# Patient Record
Sex: FEMALE | Race: BLACK OR AFRICAN AMERICAN | NOT HISPANIC OR LATINO | URBAN - METROPOLITAN AREA
[De-identification: names, ages, dates, MRNs, and addresses within clinical notes are randomized per-mention and may not be internally consistent; named-entity substitution may affect disease eponyms.]

---

## 2017-03-21 ENCOUNTER — TRANSCRIBE ORDERS (OUTPATIENT)
Dept: ADMINISTRATIVE | Facility: HOSPITAL | Age: 52
End: 2017-03-21

## 2017-03-21 DIAGNOSIS — Z78.0 ASYMPTOMATIC POSTMENOPAUSAL STATUS (AGE-RELATED) (NATURAL): ICD-10-CM

## 2017-03-21 DIAGNOSIS — Z12.31 SCREENING MAMMOGRAM FOR HIGH-RISK PATIENT: Primary | ICD-10-CM

## 2017-04-26 ENCOUNTER — HOSPITAL ENCOUNTER (OUTPATIENT)
Dept: BONE DENSITY | Facility: IMAGING CENTER | Age: 52
Discharge: HOME | End: 2017-04-26
Payer: MEDICARE

## 2017-04-26 DIAGNOSIS — Z12.31 ENCOUNTER FOR SCREENING MAMMOGRAM FOR HIGH-RISK PATIENT: ICD-10-CM

## 2017-04-26 DIAGNOSIS — Z78.0 ASYMPTOMATIC POSTMENOPAUSAL STATUS: ICD-10-CM

## 2017-04-26 PROCEDURE — G0202 SCR MAMMO BI INCL CAD: HCPCS

## 2017-04-26 PROCEDURE — 77080 DXA BONE DENSITY AXIAL: CPT

## 2017-05-08 ENCOUNTER — HOSPITAL ENCOUNTER (OUTPATIENT)
Dept: ULTRASOUND IMAGING | Facility: CLINIC | Age: 52
Discharge: HOME | End: 2017-05-08
Payer: MEDICARE

## 2017-05-08 ENCOUNTER — HOSPITAL ENCOUNTER (OUTPATIENT)
Dept: MAMMOGRAPHY | Facility: CLINIC | Age: 52
Discharge: HOME | End: 2017-05-08
Payer: MEDICARE

## 2017-05-08 DIAGNOSIS — R92.8 ABNORMAL SCREENING MAMMOGRAM: ICD-10-CM

## 2017-05-08 PROCEDURE — G0206 DX MAMMO INCL CAD UNI: HCPCS

## 2017-05-08 PROCEDURE — 76642 ULTRASOUND BREAST LIMITED: CPT

## 2018-04-18 ENCOUNTER — TRANSCRIBE ORDERS (OUTPATIENT)
Dept: ADMINISTRATIVE | Facility: HOSPITAL | Age: 53
End: 2018-04-18

## 2018-04-18 DIAGNOSIS — Z12.39 SCREENING BREAST EXAMINATION: Primary | ICD-10-CM

## 2018-04-27 ENCOUNTER — HOSPITAL ENCOUNTER (OUTPATIENT)
Dept: BONE DENSITY | Facility: IMAGING CENTER | Age: 53
Discharge: HOME | End: 2018-04-27
Payer: MEDICARE

## 2018-04-27 DIAGNOSIS — Z12.39 SCREENING BREAST EXAMINATION: ICD-10-CM

## 2018-04-27 PROCEDURE — 77067 SCR MAMMO BI INCL CAD: CPT

## 2019-03-20 ENCOUNTER — TRANSCRIBE ORDERS (OUTPATIENT)
Dept: ADMINISTRATIVE | Facility: HOSPITAL | Age: 54
End: 2019-03-20

## 2019-03-20 DIAGNOSIS — Z78.0 MENOPAUSE: Primary | ICD-10-CM

## 2019-03-20 DIAGNOSIS — Z12.31 ENCOUNTER FOR SCREENING MAMMOGRAM FOR MALIGNANT NEOPLASM OF BREAST: Primary | ICD-10-CM

## 2019-04-30 ENCOUNTER — HOSPITAL ENCOUNTER (OUTPATIENT)
Dept: BONE DENSITY | Facility: IMAGING CENTER | Age: 54
Discharge: HOME | End: 2019-04-30
Payer: MEDICARE

## 2019-04-30 VITALS — HEIGHT: 60 IN | BODY MASS INDEX: 23.75 KG/M2 | WEIGHT: 121 LBS

## 2019-04-30 DIAGNOSIS — Z78.0 MENOPAUSE: ICD-10-CM

## 2019-04-30 DIAGNOSIS — Z12.31 ENCOUNTER FOR SCREENING MAMMOGRAM FOR MALIGNANT NEOPLASM OF BREAST: ICD-10-CM

## 2019-04-30 PROCEDURE — 77063 BREAST TOMOSYNTHESIS BI: CPT

## 2019-04-30 PROCEDURE — 77067 SCR MAMMO BI INCL CAD: CPT

## 2019-04-30 PROCEDURE — 77080 DXA BONE DENSITY AXIAL: CPT

## 2019-05-02 ENCOUNTER — TELEPHONE (OUTPATIENT)
Dept: GASTROENTEROLOGY | Facility: CLINIC | Age: 54
End: 2019-05-02

## 2019-05-21 ENCOUNTER — OFFICE VISIT (OUTPATIENT)
Dept: GASTROENTEROLOGY | Facility: CLINIC | Age: 54
End: 2019-05-21
Payer: MEDICARE

## 2019-05-21 VITALS
DIASTOLIC BLOOD PRESSURE: 88 MMHG | WEIGHT: 123 LBS | HEART RATE: 70 BPM | SYSTOLIC BLOOD PRESSURE: 128 MMHG | HEIGHT: 60 IN | BODY MASS INDEX: 24.15 KG/M2

## 2019-05-21 DIAGNOSIS — Z86.010 PERSONAL HISTORY OF COLONIC POLYPS: Primary | ICD-10-CM

## 2019-05-21 DIAGNOSIS — K58.2 IRRITABLE BOWEL SYNDROME WITH BOTH CONSTIPATION AND DIARRHEA: ICD-10-CM

## 2019-05-21 DIAGNOSIS — Z86.010 PERSONAL HISTORY OF COLONIC POLYPS: ICD-10-CM

## 2019-05-21 DIAGNOSIS — Z12.11 COLON CANCER SCREENING: Primary | ICD-10-CM

## 2019-05-21 PROBLEM — Z86.0100 PERSONAL HISTORY OF COLONIC POLYPS: Status: ACTIVE | Noted: 2019-05-21

## 2019-05-21 PROCEDURE — 99203 OFFICE O/P NEW LOW 30 MIN: CPT | Performed by: NURSE PRACTITIONER

## 2019-05-21 RX ORDER — BUDESONIDE AND FORMOTEROL FUMARATE DIHYDRATE 80; 4.5 UG/1; UG/1
2 AEROSOL RESPIRATORY (INHALATION) 2 TIMES DAILY
COMMUNITY

## 2019-05-21 RX ORDER — CHLORHEXIDINE GLUCONATE 0.12 MG/ML
15 RINSE ORAL
COMMUNITY

## 2019-05-21 RX ORDER — FLUOXETINE HYDROCHLORIDE 20 MG/1
60 CAPSULE ORAL DAILY
COMMUNITY

## 2019-05-21 RX ORDER — DONEPEZIL HYDROCHLORIDE 10 MG/1
10 TABLET, FILM COATED ORAL DAILY
COMMUNITY

## 2019-05-21 RX ORDER — MONTELUKAST SODIUM 10 MG/1
10 TABLET ORAL DAILY
COMMUNITY

## 2019-05-21 RX ORDER — POLYETHYLENE GLYCOL 3350 17 G/17G
17 POWDER, FOR SOLUTION ORAL DAILY
Qty: 2 EACH | Refills: 0 | Status: SHIPPED | OUTPATIENT
Start: 2019-05-21

## 2019-05-21 RX ORDER — OLOPATADINE HYDROCHLORIDE 2 MG/ML
1 SOLUTION/ DROPS OPHTHALMIC DAILY
COMMUNITY

## 2019-05-21 RX ORDER — ACETAMINOPHEN 325 MG/1
650 TABLET ORAL EVERY 4 HOURS PRN
COMMUNITY

## 2019-05-21 RX ORDER — ALBUTEROL SULFATE 90 UG/1
2 AEROSOL, METERED RESPIRATORY (INHALATION) EVERY 4 HOURS PRN
COMMUNITY

## 2019-05-21 RX ORDER — MEMANTINE HYDROCHLORIDE 10 MG/1
10 TABLET ORAL 2 TIMES DAILY
COMMUNITY

## 2019-05-21 RX ORDER — GUAIFENESIN/DEXTROMETHORPHAN 100-10MG/5
10 SYRUP ORAL 4 TIMES DAILY PRN
COMMUNITY

## 2019-05-21 RX ORDER — TOLNAFTATE 1 G/100G
POWDER TOPICAL
COMMUNITY

## 2019-05-21 RX ORDER — WATER / MINERAL OIL / WHITE PETROLATUM 16 OZ
CREAM TOPICAL AS NEEDED
COMMUNITY

## 2019-05-21 RX ORDER — IBUPROFEN 200 MG
400 TABLET ORAL EVERY 6 HOURS PRN
COMMUNITY

## 2019-05-21 RX ORDER — PSEUDOEPHEDRINE HYDROCHLORIDE 30 MG/1
30 TABLET ORAL EVERY 4 HOURS PRN
COMMUNITY

## 2019-05-21 RX ORDER — BACITRACIN, NEOMYCIN, POLYMYXIN B 400; 3.5; 5 [USP'U]/G; MG/G; [USP'U]/G
OINTMENT TOPICAL 2 TIMES DAILY
COMMUNITY

## 2019-05-21 RX ORDER — EUCALYP/ME-SALICYLATE/MEN/THYM
MOUTHWASH MUCOUS MEMBRANE 2 TIMES DAILY
COMMUNITY

## 2019-05-21 RX ORDER — CALCIUM CARBONATE 500(1250)
500 TABLET ORAL
COMMUNITY

## 2019-05-21 RX ORDER — LORATADINE 10 MG/1
10 TABLET ORAL DAILY
COMMUNITY

## 2019-06-06 PROCEDURE — 88305 TISSUE EXAM BY PATHOLOGIST: CPT | Performed by: PATHOLOGY

## 2019-06-07 ENCOUNTER — LAB REQUISITION (OUTPATIENT)
Dept: LAB | Facility: HOSPITAL | Age: 54
End: 2019-06-07
Payer: MEDICARE

## 2019-06-07 DIAGNOSIS — D12.0 BENIGN NEOPLASM OF CECUM: ICD-10-CM

## 2019-06-07 DIAGNOSIS — D12.2 BENIGN NEOPLASM OF ASCENDING COLON: ICD-10-CM

## 2019-06-07 DIAGNOSIS — K57.30 DIVERTICULOSIS OF LARGE INTESTINE WITHOUT PERFORATION OR ABSCESS WITHOUT BLEEDING: ICD-10-CM

## 2019-06-07 DIAGNOSIS — Z86.010 HISTORY OF COLONIC POLYPS: ICD-10-CM

## 2019-12-06 ENCOUNTER — APPOINTMENT (OUTPATIENT)
Dept: RADIOLOGY | Facility: CLINIC | Age: 54
End: 2019-12-06
Payer: MEDICARE

## 2019-12-06 ENCOUNTER — OFFICE VISIT (OUTPATIENT)
Dept: URGENT CARE | Facility: CLINIC | Age: 54
End: 2019-12-06
Payer: MEDICARE

## 2019-12-06 VITALS
HEIGHT: 62 IN | HEART RATE: 62 BPM | TEMPERATURE: 98.4 F | SYSTOLIC BLOOD PRESSURE: 160 MMHG | RESPIRATION RATE: 16 BRPM | BODY MASS INDEX: 22.71 KG/M2 | WEIGHT: 123.4 LBS | DIASTOLIC BLOOD PRESSURE: 79 MMHG

## 2019-12-06 DIAGNOSIS — S29.9XXA RIB INJURY: ICD-10-CM

## 2019-12-06 DIAGNOSIS — S29.9XXA RIB INJURY: Primary | ICD-10-CM

## 2019-12-06 PROCEDURE — 99213 OFFICE O/P EST LOW 20 MIN: CPT | Performed by: EMERGENCY MEDICINE

## 2019-12-06 PROCEDURE — 71101 X-RAY EXAM UNILAT RIBS/CHEST: CPT

## 2019-12-06 PROCEDURE — G0463 HOSPITAL OUTPT CLINIC VISIT: HCPCS | Performed by: EMERGENCY MEDICINE
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
## 2020-07-21 ENCOUNTER — TELEPHONE (OUTPATIENT)
Dept: SURGICAL ONCOLOGY | Facility: CLINIC | Age: 55
End: 2020-07-21

## 2020-07-21 NOTE — TELEPHONE ENCOUNTER
New Patient Encounter    New Patient Intake Form   Patient Details: Clementine Felton  1965  [id number]    Background Information:  [address] starts by opening a telephone encounter and gathering the following information   Who is calling to schedule? If not self, relationship to patient? Britni Cruz from [facility]  [id number] [facility]  [phone]   Referring Provider Neurology  felipe jones   What is the diagnosis? Low wbc   Is this diagnosis confirmed? Yes   When was the diagnosis? 7/2020   Is there a confirmed diagnosis from a biopsy/tissue reviewed by pathology? Is patient aware of diagnosis? Yes   Is there a personal history and what kind? Is there a family history and what kind? Reason for visit? New Diagnosis   Have you had any imaging or labs done? If so: when, where? yes  Having them emailed   Are records in EPIC? no   Was the patient told to bring a disk? no   Does the patient smoke or Vape? no   If yes, how many packs or cartridges per day? Scheduling Information:   Preferred [place]:  [facility]     Are there any dates/time the patient cannot be seen? Miscellaneous: when we get the records we will call to schedule   After completing the above information, please route to Financial Counselor and the appropriate Nurse Navigator for review

## 2020-08-06 NOTE — TELEPHONE ENCOUNTER
Roz Dietz called to follow up on an appt  We have not rcd records yet  She will fax to [phone] to my attn  Will call her with appt when rcd

## 2020-08-07 NOTE — TELEPHONE ENCOUNTER
Records rcd and scanned to [facility]  I spoke with Lizeth Clark and scheduled pt to see Dr Rene Carter at the [facility] office on 9/9/20  COVID 19 policy discussed

## 2020-08-13 ENCOUNTER — TRANSCRIBE ORDERS (OUTPATIENT)
Dept: ADMINISTRATIVE | Facility: HOSPITAL | Age: 55
End: 2020-08-13

## 2020-08-13 DIAGNOSIS — Z12.31 VISIT FOR SCREENING MAMMOGRAM: Primary | ICD-10-CM

## 2020-08-26 ENCOUNTER — TELEPHONE (OUTPATIENT)
Dept: HEMATOLOGY ONCOLOGY | Facility: HOSPITAL | Age: 55
End: 2020-08-26

## 2020-08-26 NOTE — TELEPHONE ENCOUNTER
[facility] and [facility] for the patient to be seen by Ronnie Owens on 9/9 instead of Dr Chuck Pallas

## 2020-09-07 NOTE — PROGRESS NOTES
Hematology/Oncology Outpatient Consult Note  Anika Bardales 62 y o  female MRN: @ Encounter: [id number]        Date:  9/9/2020      CC:  Evaluation of leukopenia     HPI:  Anika Bardales is a 63 yo [place] American female with mild intellectual disability and early onset dementia who resides in a supervised home setting who was referred to hematology for a low white blood cell count  She is being seen for initial consultation 9/9/2020     Per review of patient's chart, she has a history of leukopenia with WBC that has ranged from 3 1 to 3 8 with a normal ANC for the past year  I do not have blood work to review prior to 1/2019  Patient is a poor historian and is not able to recall any personal history of long standing neutropenia or family history of neutropenia  Per her care taker that accompanies her today, patient does not have frequent or recurrent infections  No recent fevers/chills/night sweats  She has no recent unexplained weight loss  Denies any HIV risk factors such as having had blood transfusions, tattoos, unprotected sexual relations  There is no documented history of autoimmune disorder  She is up to date with routine screening mammography and colonoscopy       Review of last several CBCs as follows:  1/29/2019:  Hemoglobin 12 5, MCV 93 9, white blood cell count 3 8 with 62 4% neutrophils, 26 8% lymphocytes, ANC 2 37, platelets 929    27/69/9658; hemoglobin 12 3, MCV 95 7, white blood cell count 3 2 with 61 2% neutrophils, 29% lymphocytes, ANC 1 95, platelets 036    04/61/1027; hemoglobin 12 3 been CV 96, white blood cell count 3 1 with 58% neutrophils, 30 3% lymphocytes, ANC 1 79, platelets 538    81/36/8918; hemoglobin 12 6, MCV 93 8, white blood cell count 3 3, with 57 5% neutrophils, 31 4% lymphocytes, ANC 1 89, platelets 881    4/3/7349:  Hemoglobin 12 3, MCV 95 5, white blood cell count 3 2 with 52 4% neutrophils, 35 5% lymphocytes, ANC 1 67, platelets 963      Test Results:    Imaging: No results found  Labs: No results found for: WBC, HGB, HCT, MCV, PLT  No results found for: NA, K, CL, CO2, ANIONGAP, BUN, CREATININE, GLUCOSE, GLUF, CALCIUM, CORRECTEDCA, AST, ALT, ALKPHOS, PROT, BILITOT, EGFR      ROS:  Review of Systems   Constitutional: Negative for activity change, chills, fever and unexpected weight change  Hematological: Does not bruise/bleed easily  All other systems reviewed and are negative  Active Problems:   Patient Active Problem List   Diagnosis    Irritable bowel syndrome with both constipation and diarrhea    Colon cancer screening    Personal history of colonic polyps    Other neutropenia (HCC)    Bone marrow aplasia (HCC)    Iron deficiency anemia, unspecified       Past Medical History:   Past Medical History:   Diagnosis Date    Asthma     Cataract     OD    Environmental allergies     H/O seasonal allergies     IBS (irritable bowel syndrome)     alternating    Mild intellectual disability     Mood disorder (HCC)     Tinea pedis     Unspecified dementia without behavioral disturbance        Surgical History: No past surgical history on file  Family History:    Family History   Family history unknown: Yes       Family history is unknown by patient      Social History:   Social History     Socioeconomic History    Marital status: /Civil Union     Spouse name: Not on file    Number of children: Not on file    Years of education: Not on file    Highest education level: Not on file   Occupational History    Not on file   Social Needs    Financial resource strain: Not on file    Food insecurity     Worry: Not on file     Inability: Not on file   [employer] needs     Medical: Not on file     Non-medical: Not on file   Tobacco Use    Smoking status: Never Smoker    Smokeless tobacco: Never Used   Substance and Sexual Activity    Alcohol use: Never     Frequency: Never    Drug use: Never    Sexual activity: Not on file   Lifestyle    Physical activity     Days per week: Not on file     Minutes per session: Not on file    Stress: Not on file   Relationships    Social connections     Talks on phone: Not on file     Gets together: Not on file     Attends [employer] service: Not on file     Active member of club or organization: Not on file     Attends meetings of clubs or organizations: Not on file     Relationship status: Not on file    Intimate partner violence     Fear of current or ex partner: Not on file     Emotionally abused: Not on file     Physically abused: Not on file     Forced sexual activity: Not on file   Other Topics Concern    Not on file   Social History Narrative    Not on file       Current Medications:   Current Outpatient Medications   Medication Sig Dispense Refill    acetaminophen (TYLENOL) 325 mg tablet Take 650 mg by mouth every 4 (four) hours as needed for mild pain      albuterol (PROVENTIL HFA,VENTOLIN HFA) 90 mcg/act inhaler Inhale 2 puffs every 4 (four) hours as needed for wheezing      budesonide-formoterol (SYMBICORT) 80-4 5 MCG/ACT inhaler Inhale 2 puffs 2 (two) times a day Rinse mouth after use        Calcium 500 MG tablet Take 500 mg by mouth      chlorhexidine (PERIDEX) 0 12 % solution Apply 15 mL to the mouth or throat daily at bedtime      dextromethorphan-guaiFENesin (ROBITUSSIN DM)  mg/5 mL syrup Take 10 mL by mouth 4 (four) times a day as needed for cough      donepezil (ARICEPT) 10 mg tablet Take 10 mg by mouth daily      FLUoxetine (PROzac) 20 mg capsule Take 60 mg by mouth daily      ibuprofen (MOTRIN) 200 mg tablet Take 400 mg by mouth every 6 (six) hours as needed for mild pain      loratadine (CLARITIN) 10 mg tablet Take 10 mg by mouth daily      memantine (NAMENDA) 10 mg tablet Take 10 mg by mouth 2 (two) times a day      Menthol-Methyl Salicylate (BENGAY GREASELESS EX) Apply topically as needed      montelukast (SINGULAIR) 10 mg tablet Take 10 mg by mouth daily      Mouthwashes (LISTERINE ANTISEPTIC) LIQD Apply to the mouth or throat 2 (two) times a day      Na Sulfate-K Sulfate-Mg Sulf (SUPREP BOWEL PREP KIT) 17 5-3 13-1 6 GM/177ML SOLN Use as directed 2 Bottle 0    neomycin-bacitracin-polymyxin b (NEOSPORIN) ointment Apply topically 2 (two) times a day      olopatadine HCl (PATADAY) 0 2 % opth drops Administer 1 drop to both eyes daily      polyethylene glycol (MIRALAX) 17 g packet Take 17 g by mouth daily 2 each 0    pseudoephedrine (SUDAFED) 30 mg tablet Take 30 mg by mouth every 4 (four) hours as needed for congestion      Sod Fluoride-Potassium Nitrate (PREVIDENT 5000 SENSITIVE) 1 1-5 % PSTE Apply to teeth daily      tolnaftate (TINACTIN) 1 % powder Apply topically daily at bedtime      white petrolatum-mineral oil (EUCERIN) cream Apply topically as needed for dry skin       No current facility-administered medications for this visit  Allergies: No Known Allergies      Physical Exam:    Body surface area is 1 54 meters squared  Wt Readings from Last 3 Encounters:   09/09/20 57 6 kg (127 lb)   12/06/19 56 kg (123 lb 6 4 oz)   05/21/19 55 8 kg (123 lb)        Temp Readings from Last 3 Encounters:   09/09/20 (!) 96 2 °F (35 7 °C) (Tympanic)   12/06/19 98 4 °F (36 9 °C) (Oral)        BP Readings from Last 3 Encounters:   09/09/20 128/78   12/06/19 160/79   05/21/19 128/88         Pulse Readings from Last 3 Encounters:   09/09/20 75   12/06/19 62   05/21/19 70          Physical Exam  Constitutional:       Appearance: Normal appearance  HENT:      Head: Normocephalic and atraumatic  Mouth/Throat:      Mouth: Mucous membranes are moist       Pharynx: Oropharynx is clear  Eyes:      General: No scleral icterus  Right eye: No discharge  Left eye: No discharge  Extraocular Movements: Extraocular movements intact  Neck:      Musculoskeletal: Normal range of motion and neck supple     Cardiovascular:      Rate and Rhythm: Normal rate and regular rhythm  Pulmonary:      Effort: Pulmonary effort is normal       Breath sounds: Normal breath sounds  Abdominal:      General: Bowel sounds are normal       Palpations: Abdomen is soft  Musculoskeletal: Normal range of motion  Lymphadenopathy:      Cervical: No cervical adenopathy  Skin:     General: Skin is warm and dry  Neurological:      General: No focal deficit present  Mental Status: She is alert and oriented to person, place, and time  Psychiatric:         Behavior: Behavior normal               Assessment/ Plan:    1  Other neutropenia ([address] )    The patient is a very pleasant 78-year-old [place] American female with a slightly decreased white blood cell count and normal ANC over the past year  Her WBC has fluctuated between 3 1 and 3 8  There is no evidence of anemia or platelet abnormality  She denies any recent or recurrent infections  Differential includes benign ethnic neutropenia however I will investigate further for autoimmune causes for her neutropenia and do a thorough workup to include flow cytometry, PENELOPE, CRP, MMA, folate, iron panel  I will also repeat her CBC and CMP  Patient was instructed to have labs drawn and return for a follow-up in 6 weeks to review the results  Patient was agreeable to this plan of care  Her caretaker verbalized understanding as well and reports she will ensure that all requested blood work is done  If patient's workup does not show any significant cause for her decreased white blood cell count, we can likely presume she has a benign ethnic neutropenia that is most common in people of  descent and is not associated with any increased risk for infections or other clinical manifestations  Patient and her caretaker were instructed to call with any questions or concerns prior to her next visit    Portions of the record may have been created with voice recognition software    Occasional wrong word or "sound a like" substitutions may have occurred due to the inherent limitations of voice recognition software  Read the chart carefully and recognize, using context, where substitutions have occurred

## 2020-09-08 ENCOUNTER — TELEPHONE (OUTPATIENT)
Dept: HEMATOLOGY ONCOLOGY | Facility: CLINIC | Age: 55
End: 2020-09-08

## 2020-09-09 ENCOUNTER — CONSULT (OUTPATIENT)
Dept: HEMATOLOGY ONCOLOGY | Facility: HOSPITAL | Age: 55
End: 2020-09-09
Payer: MEDICARE

## 2020-09-09 VITALS
RESPIRATION RATE: 16 BRPM | WEIGHT: 127 LBS | OXYGEN SATURATION: 98 % | DIASTOLIC BLOOD PRESSURE: 78 MMHG | HEIGHT: 60 IN | HEART RATE: 75 BPM | TEMPERATURE: 96.2 F | SYSTOLIC BLOOD PRESSURE: 128 MMHG | BODY MASS INDEX: 24.94 KG/M2

## 2020-09-09 DIAGNOSIS — D70.8 OTHER NEUTROPENIA (HCC): Primary | ICD-10-CM

## 2020-09-09 DIAGNOSIS — D50.9 IRON DEFICIENCY ANEMIA, UNSPECIFIED IRON DEFICIENCY ANEMIA TYPE: ICD-10-CM

## 2020-09-09 DIAGNOSIS — D61.9 BONE MARROW APLASIA (HCC): ICD-10-CM

## 2020-09-09 DIAGNOSIS — E53.8 FOLATE DEFICIENCY: ICD-10-CM

## 2020-09-09 PROCEDURE — 99205 OFFICE O/P NEW HI 60 MIN: CPT | Performed by: NURSE PRACTITIONER

## 2020-09-18 ENCOUNTER — NURSE TRIAGE (OUTPATIENT)
Dept: OTHER | Facility: OTHER | Age: 55
End: 2020-09-18

## 2020-09-18 DIAGNOSIS — Z11.59 ENCOUNTER FOR SCREENING FOR OTHER VIRAL DISEASES: Primary | ICD-10-CM

## 2020-09-18 NOTE — TELEPHONE ENCOUNTER
Directions were given to caregiver for [facility] site for tomorrow morning

## 2020-09-18 NOTE — TELEPHONE ENCOUNTER
Reason for Disposition   [1] COVID-19 EXPOSURE (Close Contact) within last 14 days AND [2] NO cough, fever, or breathing difficulty    Answer Assessment - Initial Assessment Questions  1  CLOSE CONTACT: "Who is the person with the confirmed or suspected COVID-19 infection that you were exposed to?"      A person at work tested positive  2  PLACE of CONTACT: "Where were you when you were exposed to COVID-19?" (e g , home, school, medical waiting room; which city?)      Work place  3  TYPE of CONTACT: "How much contact was there?" (e g , sitting next to, live in same house, work in same office, same building)      sitting  4  DURATION of CONTACT: "How long were you in contact with the COVID-19 patient?" (e g , a few seconds, passed by person, a few minutes, live with the patient)     unsure  5  DATE of CONTACT: "When did you have contact with a COVID-19 patient?" (e g , how many days ago)     Unsure of the date  6  TRAVEL: "Have you traveled out of the country recently?" If so, "When and where?"      * Also ask about out-of-state travel, since the CDC has identified some high risk cities for community spread in the [address]  * Note: Travel becomes less relevant if there is widespread community transmission where the patient lives  None  7  COMMUNITY SPREAD: "Are there lots of cases of COVID-19 (community spread) where you live?" (See public health department website, if unsure)    * MAJOR community spread: high number of cases; numbers of cases are increasing; many people hospitalized  * MINOR community spread: low number of cases; not increasing; few or no people hospitalized      Minor  8  SYMPTOMS: "Do you have any symptoms?" (e g , fever, cough, breathing difficulty)      No symptoms  9  PREGNANCY OR POSTPARTUM: "Is there any chance you are pregnant?" "When was your last menstrual period?" "Did you deliver in the last 2 weeks?"      NA  10  HIGH RISK: "Do you have any heart or lung problems?  Do you have a weak immune system?" (e g , CHF, COPD, asthma, HIV positive, chemotherapy, renal failure, diabetes mellitus, sickle cell anemia)        No    Protocols used: CORONAVIRUS (COVID-19) - EXPOSURE-ADULT-AH

## 2020-09-18 NOTE — TELEPHONE ENCOUNTER
Regarding: COVID-19 test request - exposure - ASYMPTOMATIC  ----- Message from Clara Garg sent at 9/18/2020  6:00 PM EDT -----  "I need the patient to be tested, she came in contact with someone who tested positive " She is in a group home

## 2020-09-21 DIAGNOSIS — Z11.59 ENCOUNTER FOR SCREENING FOR OTHER VIRAL DISEASES: ICD-10-CM

## 2020-09-21 PROCEDURE — U0003 INFECTIOUS AGENT DETECTION BY NUCLEIC ACID (DNA OR RNA); SEVERE ACUTE RESPIRATORY SYNDROME CORONAVIRUS 2 (SARS-COV-2) (CORONAVIRUS DISEASE [COVID-19]), AMPLIFIED PROBE TECHNIQUE, MAKING USE OF HIGH THROUGHPUT TECHNOLOGIES AS DESCRIBED BY CMS-2020-01-R: HCPCS | Performed by: FAMILY MEDICINE

## 2020-09-23 LAB — SARS-COV-2 RNA SPEC QL NAA+PROBE: NOT DETECTED

## 2020-10-07 ENCOUNTER — HOSPITAL ENCOUNTER (OUTPATIENT)
Dept: BONE DENSITY | Facility: IMAGING CENTER | Age: 55
Discharge: HOME | End: 2020-10-07
Payer: MEDICARE

## 2020-10-07 VITALS — WEIGHT: 130 LBS | BODY MASS INDEX: 24.55 KG/M2 | HEIGHT: 61 IN

## 2020-10-07 DIAGNOSIS — Z12.31 VISIT FOR SCREENING MAMMOGRAM: ICD-10-CM

## 2020-10-07 PROCEDURE — 77067 SCR MAMMO BI INCL CAD: CPT

## 2020-10-07 PROCEDURE — 77063 BREAST TOMOSYNTHESIS BI: CPT

## 2020-10-21 ENCOUNTER — OFFICE VISIT (OUTPATIENT)
Dept: HEMATOLOGY ONCOLOGY | Facility: HOSPITAL | Age: 55
End: 2020-10-21
Payer: MEDICARE

## 2020-10-21 VITALS
BODY MASS INDEX: 23.6 KG/M2 | DIASTOLIC BLOOD PRESSURE: 78 MMHG | HEIGHT: 61 IN | SYSTOLIC BLOOD PRESSURE: 118 MMHG | HEART RATE: 72 BPM | TEMPERATURE: 97.9 F | WEIGHT: 125 LBS | RESPIRATION RATE: 16 BRPM | OXYGEN SATURATION: 98 %

## 2020-10-21 DIAGNOSIS — D50.9 IRON DEFICIENCY ANEMIA, UNSPECIFIED IRON DEFICIENCY ANEMIA TYPE: Primary | ICD-10-CM

## 2020-10-21 DIAGNOSIS — D70.8 OTHER NEUTROPENIA (HCC): ICD-10-CM

## 2020-10-21 PROCEDURE — 99214 OFFICE O/P EST MOD 30 MIN: CPT | Performed by: NURSE PRACTITIONER

## 2021-04-27 ENCOUNTER — TRANSCRIBE ORDERS (OUTPATIENT)
Dept: ADMINISTRATIVE | Facility: HOSPITAL | Age: 56
End: 2021-04-27

## 2021-04-27 DIAGNOSIS — Z13.820 ENCOUNTER FOR SCREENING FOR OSTEOPOROSIS: Primary | ICD-10-CM

## 2021-04-27 DIAGNOSIS — Z78.0 MENOPAUSE: ICD-10-CM

## 2021-05-12 ENCOUNTER — HOSPITAL ENCOUNTER (OUTPATIENT)
Dept: BONE DENSITY | Facility: IMAGING CENTER | Age: 56
Discharge: HOME | End: 2021-05-12
Payer: MEDICARE

## 2021-05-12 DIAGNOSIS — Z13.820 ENCOUNTER FOR SCREENING FOR OSTEOPOROSIS: ICD-10-CM

## 2021-05-12 DIAGNOSIS — Z78.0 MENOPAUSE: ICD-10-CM

## 2021-05-12 PROCEDURE — 77080 DXA BONE DENSITY AXIAL: CPT

## 2021-09-27 ENCOUNTER — TELEPHONE (OUTPATIENT)
Dept: HEMATOLOGY ONCOLOGY | Facility: CLINIC | Age: 56
End: 2021-09-27

## 2021-09-27 NOTE — TELEPHONE ENCOUNTER
Reynaldo Acosta with [facility] calling to confirm appt with Dr Susanne Myers  I advised patient is not scheduled for an appt and I do not see one was scheduled  She will call back once she gets with a nurse regarding patient

## 2021-10-21 ENCOUNTER — HOSPITAL ENCOUNTER (OUTPATIENT)
Dept: MAMMOGRAPHY | Facility: IMAGING CENTER | Age: 56
Discharge: HOME | End: 2021-10-21
Payer: MEDICARE

## 2021-10-21 VITALS — BODY MASS INDEX: 23.6 KG/M2 | WEIGHT: 125 LBS | HEIGHT: 61 IN

## 2021-10-21 DIAGNOSIS — Z12.31 ENCOUNTER FOR SCREENING MAMMOGRAM FOR MALIGNANT NEOPLASM OF BREAST: ICD-10-CM

## 2021-10-21 PROCEDURE — 77063 BREAST TOMOSYNTHESIS BI: CPT

## 2021-10-21 PROCEDURE — 77067 SCR MAMMO BI INCL CAD: CPT

## 2022-10-24 ENCOUNTER — HOSPITAL ENCOUNTER (OUTPATIENT)
Dept: MAMMOGRAPHY | Facility: IMAGING CENTER | Age: 57
Discharge: HOME | End: 2022-10-24
Payer: MEDICARE

## 2022-10-24 VITALS — HEIGHT: 61 IN | WEIGHT: 125 LBS | BODY MASS INDEX: 23.6 KG/M2

## 2022-10-24 DIAGNOSIS — Z12.31 ENCOUNTER FOR SCREENING MAMMOGRAM FOR MALIGNANT NEOPLASM OF BREAST: ICD-10-CM

## 2022-10-24 PROCEDURE — 77067 SCR MAMMO BI INCL CAD: CPT

## 2022-10-24 PROCEDURE — 77063 BREAST TOMOSYNTHESIS BI: CPT

## 2023-05-16 ENCOUNTER — HOSPITAL ENCOUNTER (OUTPATIENT)
Dept: BONE DENSITY | Facility: IMAGING CENTER | Age: 58
Discharge: HOME | End: 2023-05-16

## 2023-05-16 DIAGNOSIS — Z78.0 MENOPAUSE: ICD-10-CM

## 2023-05-31 ENCOUNTER — HOSPITAL ENCOUNTER (OUTPATIENT)
Dept: BONE DENSITY | Facility: IMAGING CENTER | Age: 58
Discharge: HOME | End: 2023-05-31

## 2023-05-31 VITALS — WEIGHT: 122.6 LBS | HEIGHT: 60 IN | BODY MASS INDEX: 24.07 KG/M2

## 2023-10-26 ENCOUNTER — HOSPITAL ENCOUNTER (OUTPATIENT)
Dept: MAMMOGRAPHY | Facility: IMAGING CENTER | Age: 58
Discharge: HOME | End: 2023-10-26
Payer: MEDICARE

## 2023-10-26 VITALS — HEIGHT: 60 IN | BODY MASS INDEX: 24.54 KG/M2 | WEIGHT: 125 LBS

## 2023-10-26 DIAGNOSIS — Z12.31 ENCOUNTER FOR SCREENING MAMMOGRAM FOR MALIGNANT NEOPLASM OF BREAST: ICD-10-CM

## 2023-10-26 PROCEDURE — 77067 SCR MAMMO BI INCL CAD: CPT

## 2023-10-26 PROCEDURE — 77063 BREAST TOMOSYNTHESIS BI: CPT

## 2024-02-21 PROBLEM — Z12.11 COLON CANCER SCREENING: Status: RESOLVED | Noted: 2019-05-21 | Resolved: 2024-02-21

## 2024-04-18 ENCOUNTER — TELEPHONE (OUTPATIENT)
Age: 59
End: 2024-04-18

## 2024-04-18 NOTE — TELEPHONE ENCOUNTER
Scheduled date of colonoscopy (as of today):6/10/24  Physician performing colonoscopy:DR PALMER  Location of colonoscopy:[facility]  Bowel prep reviewed with patient:HAJA NEEDS SCRIPT  Instructions reviewed with patient by:HAJA  Clearances:NA     PT IS IN A CARE HOME AND NEEDS THE PREP SENT TO HER PHARMACY. THEY ALSO NEED INSTRUCTIONS MAILED TO   [address]  [place] [address]    PT IS IN A CARE HOME

## 2024-04-18 NOTE — TELEPHONE ENCOUNTER
Does the patient want to see a Gastroenterologist prior to their procedure OR are they having any GI symptoms? no    Has the patient been hospitalized or had abdominal surgery in the past 6 months? no    Does the patient use supplemental oxygen? no    Does the patient take Coumadin, Lovenox, Plavix, Elliquis, Xarelto, or other blood thinning medication? no    Has the patient had a stroke, cardiac event, or stent placed in the past year? no    BMI 24.82

## 2024-05-13 ENCOUNTER — ANESTHESIA (OUTPATIENT)
Dept: ANESTHESIOLOGY | Facility: AMBULATORY SURGERY CENTER | Age: 59
End: 2024-05-13

## 2024-05-13 ENCOUNTER — ANESTHESIA EVENT (OUTPATIENT)
Dept: ANESTHESIOLOGY | Facility: AMBULATORY SURGERY CENTER | Age: 59
End: 2024-05-13

## 2024-05-30 ENCOUNTER — TELEPHONE (OUTPATIENT)
Dept: GASTROENTEROLOGY | Facility: CLINIC | Age: 59
End: 2024-05-30

## 2024-05-30 DIAGNOSIS — Z86.010 PERSONAL HISTORY OF COLONIC POLYPS: Primary | ICD-10-CM

## 2024-05-30 RX ORDER — POLYETHYLENE GLYCOL 3350 17 G/17G
238 POWDER, FOR SOLUTION ORAL ONCE
Status: SHIPPED | OUTPATIENT
Start: 2024-05-30

## 2024-05-30 RX ORDER — POLYETHYLENE GLYCOL 3350 17 G/17G
17 POWDER, FOR SOLUTION ORAL DAILY
Qty: 2 EACH | Refills: 0 | Status: CANCELLED | OUTPATIENT
Start: 2024-05-30

## 2024-05-30 RX ORDER — BISACODYL 5 MG/1
5 TABLET, DELAYED RELEASE ORAL AS NEEDED
Qty: 4 TABLET | Refills: 0 | Status: SHIPPED | OUTPATIENT
Start: 2024-05-30

## 2024-05-30 RX ORDER — BISACODYL 5 MG/1
5 TABLET, DELAYED RELEASE ORAL DAILY PRN
Qty: 30 TABLET | Refills: 0 | Status: CANCELLED | OUTPATIENT
Start: 2024-05-30

## 2024-05-30 NOTE — TELEPHONE ENCOUNTER
Procedure confirmed  Colonoscopy     Via: Patient's caretaker    Instructions given: Mail     Prep Given: Miralax/Dulcolax    Call the office if there are any questions.

## 2024-06-10 ENCOUNTER — ANESTHESIA EVENT (OUTPATIENT)
Dept: GASTROENTEROLOGY | Facility: AMBULATORY SURGERY CENTER | Age: 59
End: 2024-06-10

## 2024-06-10 ENCOUNTER — HOSPITAL ENCOUNTER (OUTPATIENT)
Dept: GASTROENTEROLOGY | Facility: AMBULATORY SURGERY CENTER | Age: 59
Discharge: HOME | End: 2024-06-10
Payer: MEDICARE

## 2024-06-10 ENCOUNTER — ANESTHESIA (OUTPATIENT)
Dept: GASTROENTEROLOGY | Facility: AMBULATORY SURGERY CENTER | Age: 59
End: 2024-06-10

## 2024-06-10 VITALS
DIASTOLIC BLOOD PRESSURE: 80 MMHG | BODY MASS INDEX: 24.54 KG/M2 | TEMPERATURE: 97.1 F | HEART RATE: 61 BPM | OXYGEN SATURATION: 99 % | WEIGHT: 125 LBS | SYSTOLIC BLOOD PRESSURE: 145 MMHG | HEIGHT: 60 IN | RESPIRATION RATE: 19 BRPM

## 2024-06-10 DIAGNOSIS — Z86.010 HISTORY OF COLON POLYPS: ICD-10-CM

## 2024-06-10 DIAGNOSIS — Z12.11 SCREENING FOR COLON CANCER: ICD-10-CM

## 2024-06-10 PROCEDURE — G0105 COLORECTAL SCRN; HI RISK IND: HCPCS | Performed by: INTERNAL MEDICINE

## 2024-06-10 RX ORDER — PROPOFOL 10 MG/ML
INJECTION, EMULSION INTRAVENOUS AS NEEDED
Status: DISCONTINUED | OUTPATIENT
Start: 2024-06-10 | End: 2024-06-10

## 2024-06-10 RX ORDER — SODIUM CHLORIDE, SODIUM LACTATE, POTASSIUM CHLORIDE, CALCIUM CHLORIDE 600; 310; 30; 20 MG/100ML; MG/100ML; MG/100ML; MG/100ML
INJECTION, SOLUTION INTRAVENOUS CONTINUOUS PRN
Status: DISCONTINUED | OUTPATIENT
Start: 2024-06-10 | End: 2024-06-10

## 2024-06-10 RX ADMIN — PROPOFOL 30 MG: 10 INJECTION, EMULSION INTRAVENOUS at 08:27

## 2024-06-10 RX ADMIN — SODIUM CHLORIDE, SODIUM LACTATE, POTASSIUM CHLORIDE, CALCIUM CHLORIDE: 600; 310; 30; 20 INJECTION, SOLUTION INTRAVENOUS at 08:05

## 2024-06-10 RX ADMIN — PROPOFOL 30 MG: 10 INJECTION, EMULSION INTRAVENOUS at 08:31

## 2024-06-10 RX ADMIN — PROPOFOL 100 MG: 10 INJECTION, EMULSION INTRAVENOUS at 08:16

## 2024-06-10 RX ADMIN — PROPOFOL 40 MG: 10 INJECTION, EMULSION INTRAVENOUS at 08:23

## 2024-06-10 NOTE — ANESTHESIA PREPROCEDURE EVALUATION
Procedure:  COLONOSCOPY    Relevant Problems   HEMATOLOGY   (+) Iron deficiency anemia, unspecified      PULMONARY   (+) Asthma      Behavioral Health   (+) Mood disorder (HCC)      Neurology/Sleep   (+) Mild intellectual disability      Other   (+) Bone marrow aplasia (HCC)        Physical Exam    Airway    Mallampati score: II  TM Distance: >3 FB  Neck ROM: full     Dental   No notable dental hx     Cardiovascular      Pulmonary      Other Findings  post-pubertal.      Anesthesia Plan  ASA Score- 2     Anesthesia Type- IV sedation with anesthesia with ASA Monitors.         Additional Monitors:     Airway Plan:            Plan Factors-    Chart reviewed.    Patient summary reviewed.    Patient is not a current smoker.              Induction- intravenous.    Postoperative Plan-         Informed Consent- Anesthetic plan and risks discussed with patient.  I personally reviewed this patient with the CRNA. Discussed and agreed on the Anesthesia Plan with the CRNA..

## 2024-06-10 NOTE — H&P
History and Physical - [facility]  Sierra Jacobsen 58 y.o. female MRN: [id number]    HPI: Sierra Jacobsen is a 58 y.o. year old female who presents for colonoscopy secondary to personal history of polyps    REVIEW OF SYSTEMS: Per the HPI, and otherwise unremarkable.    Historical Information   Past Medical History:   Diagnosis Date    Asthma     Cataract     OD    Colon polyp     Environmental allergies     H/O seasonal allergies     IBS (irritable bowel syndrome)     alternating    Mild intellectual disability     Mood disorder (HCC)     Tinea pedis     Unspecified dementia without behavioral disturbance      Past Surgical History:   Procedure Laterality Date    COLONOSCOPY       Social History   Social History     Substance and Sexual Activity   Alcohol Use Never     Social History     Substance and Sexual Activity   Drug Use Never     Social History     Tobacco Use   Smoking Status Never   Smokeless Tobacco Never     Family History   Family history unknown: Yes       Meds/Allergies       Current Outpatient Medications:     budesonide-formoterol (SYMBICORT) 80-4.5 MCG/ACT inhaler    donepezil (ARICEPT) 10 mg tablet    FLUoxetine (PROzac) 20 mg capsule    ibuprofen (MOTRIN) 200 mg tablet    loratadine (CLARITIN) 10 mg tablet    memantine (NAMENDA) 10 mg tablet    montelukast (SINGULAIR) 10 mg tablet    acetaminophen (TYLENOL) 325 mg tablet    albuterol (PROVENTIL HFA,VENTOLIN HFA) 90 mcg/act inhaler    bisacodyl (DULCOLAX) 5 mg EC tablet    Calcium 500 MG tablet    chlorhexidine (PERIDEX) 0.12 % solution    dextromethorphan-guaiFENesin (ROBITUSSIN DM)  mg/5 mL syrup    Menthol-Methyl Salicylate (BENGAY GREASELESS EX)    Mouthwashes (LISTERINE ANTISEPTIC) LIQD    Na Sulfate-K Sulfate-Mg Sulf (SUPREP BOWEL PREP KIT) 17.5-3.13-1.6 GM/177ML SOLN    neomycin-bacitracin-polymyxin b (NEOSPORIN) ointment    olopatadine HCl (PATADAY) 0.2 % opth drops    polyethylene glycol (MIRALAX) 17 g packet     "pseudoephedrine (SUDAFED) 30 mg tablet    Sod Fluoride-Potassium Nitrate (PREVIDENT 5000 SENSITIVE) 1.1-5 % PSTE    tolnaftate (TINACTIN) 1 % powder    white petrolatum-mineral oil (EUCERIN) cream    Current Facility-Administered Medications:     polyethylene glycol (GLYCOLAX) bowel prep 238 g, 238 g, Oral, Once    No Known Allergies    Objective     Ht 4' 11.5\" (1.511 m)   Wt 56.7 kg (125 lb)   BMI 24.82 kg/m²     PHYSICAL EXAM    Gen: NAD AAOx3  Head: Normocephalic, Atraumatic  CV: S1S2 RRR no m/r/g  CHEST: Clear b/l no c/r/w  ABD: soft, +BS NT/ND  EXT: no edema    ASSESSMENT/PLAN:  This is a 58 y.o. year old female here for colonoscopy secondary to personal history of polyps, and she is stable and optimized for her procedure.        "

## 2024-06-10 NOTE — ANESTHESIA POSTPROCEDURE EVALUATION
Post-Op Assessment Note    CV Status:  Stable  Pain Score: 0    Pain management: adequate       Mental Status:  Alert and awake   Hydration Status:  Euvolemic   PONV Controlled:  Controlled   Airway Patency:  Patent     Post Op Vitals Reviewed: Yes    No anethesia notable event occurred.    Staff: Anesthesiologist, CRNA               BP 92/54 (06/10/24 0835)    Temp      Pulse 59 (06/10/24 0835)   Resp 16 (06/10/24 0835)    SpO2 98 % (06/10/24 0835)

## 2024-08-02 ENCOUNTER — TELEPHONE (OUTPATIENT)
Age: 59
End: 2024-08-02

## 2024-10-28 ENCOUNTER — HOSPITAL ENCOUNTER (OUTPATIENT)
Dept: MAMMOGRAPHY | Facility: CLINIC | Age: 59
Discharge: HOME | End: 2024-10-28
Payer: MEDICARE

## 2024-10-28 VITALS — WEIGHT: 125 LBS | BODY MASS INDEX: 24.54 KG/M2 | HEIGHT: 60 IN

## 2024-10-28 DIAGNOSIS — Z12.31 ENCOUNTER FOR SCREENING MAMMOGRAM FOR MALIGNANT NEOPLASM OF BREAST: ICD-10-CM

## 2024-10-28 PROCEDURE — 77063 BREAST TOMOSYNTHESIS BI: CPT

## 2024-10-28 PROCEDURE — 77067 SCR MAMMO BI INCL CAD: CPT

## 2024-11-26 ENCOUNTER — HOSPITAL ENCOUNTER (OUTPATIENT)
Dept: ULTRASOUND IMAGING | Facility: CLINIC | Age: 59
Discharge: HOME | End: 2024-11-26
Payer: MEDICARE

## 2024-11-26 DIAGNOSIS — R92.8 ABNORMAL MAMMOGRAM: ICD-10-CM

## 2024-11-26 PROCEDURE — 76642 ULTRASOUND BREAST LIMITED: CPT

## 2025-06-17 ENCOUNTER — HOSPITAL ENCOUNTER (OUTPATIENT)
Dept: BONE DENSITY | Facility: IMAGING CENTER | Age: 60
Discharge: HOME | End: 2025-06-17
Attending: FAMILY MEDICINE
Payer: MEDICARE

## 2025-06-17 VITALS — HEIGHT: 60 IN | BODY MASS INDEX: 26.31 KG/M2 | WEIGHT: 134 LBS

## 2025-06-17 DIAGNOSIS — Z13.820 ENCOUNTER FOR SCREENING FOR OSTEOPOROSIS: ICD-10-CM

## 2025-06-17 PROCEDURE — 77080 DXA BONE DENSITY AXIAL: CPT

## 2025-08-20 DIAGNOSIS — Z12.31 ENCOUNTER FOR SCREENING MAMMOGRAM FOR MALIGNANT NEOPLASM OF BREAST: ICD-10-CM
